# Patient Record
Sex: MALE | Race: WHITE | NOT HISPANIC OR LATINO | URBAN - METROPOLITAN AREA
[De-identification: names, ages, dates, MRNs, and addresses within clinical notes are randomized per-mention and may not be internally consistent; named-entity substitution may affect disease eponyms.]

---

## 2018-11-23 ENCOUNTER — EMERGENCY (EMERGENCY)
Facility: HOSPITAL | Age: 33
LOS: 1 days | Discharge: HOME | End: 2018-11-23
Attending: EMERGENCY MEDICINE | Admitting: EMERGENCY MEDICINE
Payer: MEDICAID

## 2018-11-23 VITALS
RESPIRATION RATE: 16 BRPM | HEART RATE: 60 BPM | TEMPERATURE: 98 F | DIASTOLIC BLOOD PRESSURE: 74 MMHG | SYSTOLIC BLOOD PRESSURE: 119 MMHG

## 2018-11-23 VITALS
RESPIRATION RATE: 18 BRPM | HEART RATE: 60 BPM | OXYGEN SATURATION: 100 % | TEMPERATURE: 98 F | SYSTOLIC BLOOD PRESSURE: 133 MMHG | DIASTOLIC BLOOD PRESSURE: 70 MMHG

## 2018-11-23 LAB
APPEARANCE UR: CLEAR — SIGNIFICANT CHANGE UP
BILIRUB UR-MCNC: NEGATIVE — SIGNIFICANT CHANGE UP
BLOOD UR QL VISUAL: NEGATIVE — SIGNIFICANT CHANGE UP
COLOR SPEC: SIGNIFICANT CHANGE UP
GLUCOSE UR-MCNC: NEGATIVE — SIGNIFICANT CHANGE UP
KETONES UR-MCNC: NEGATIVE — SIGNIFICANT CHANGE UP
LEUKOCYTE ESTERASE UR-ACNC: NEGATIVE — SIGNIFICANT CHANGE UP
NITRITE UR-MCNC: NEGATIVE — SIGNIFICANT CHANGE UP
PH UR: 7 — SIGNIFICANT CHANGE UP (ref 5–8)
PROT UR-MCNC: NEGATIVE — SIGNIFICANT CHANGE UP
SP GR SPEC: 1.03 — SIGNIFICANT CHANGE UP (ref 1–1.04)
UROBILINOGEN FLD QL: NORMAL — SIGNIFICANT CHANGE UP

## 2018-11-23 PROCEDURE — 76870 US EXAM SCROTUM: CPT | Mod: 26

## 2018-11-23 PROCEDURE — 93975 VASCULAR STUDY: CPT | Mod: 26

## 2018-11-23 PROCEDURE — 99284 EMERGENCY DEPT VISIT MOD MDM: CPT

## 2018-11-23 RX ORDER — IBUPROFEN 200 MG
600 TABLET ORAL ONCE
Qty: 0 | Refills: 0 | Status: COMPLETED | OUTPATIENT
Start: 2018-11-23 | End: 2018-11-23

## 2018-11-23 RX ADMIN — Medication 600 MILLIGRAM(S): at 15:10

## 2018-11-23 RX ADMIN — Medication 600 MILLIGRAM(S): at 14:33

## 2018-11-23 NOTE — ED ADULT TRIAGE NOTE - CHIEF COMPLAINT QUOTE
Pt from urgent care for r/o testicular torsion, c/o left testicular pain and LLQ pain since waking up this morning, denies urinary complaints, denies discharge.

## 2018-11-23 NOTE — ED PROVIDER NOTE - NSFOLLOWUPINSTRUCTIONS_ED_ALL_ED_FT
Take ibuprofen 400mg every 6 hours as needed for pain. See the drug information box on packaging for more information.     Follow up in the urology clinic as soon as possible.    Please return to the ER for new or worsening pain, fevers or chills, difficulty urinating, or scrotal skin changes.

## 2018-11-23 NOTE — ED PROVIDER NOTE - OBJECTIVE STATEMENT
32 y/o male with PMHx of Asthma presents to the ED for left testicular pain today. Pt states today around 6am he turned over and started having achy left testicular pain. Pt went to [facility] who sent pt in for evaluation. Pt denies any fever, chills, nausea, vomiting, SOB, chest pain, or dysuria. Denies any penile discharge.

## 2018-11-23 NOTE — ED PROVIDER NOTE - PROGRESS NOTE DETAILS
Patient reports pain is bareable, slightly improved since starting this AM. Jorje pgy1 Patient reports pain is bareable, slightly improved since starting this AM. Plan for discharge w urology follow up. No epididmytis on US and clean UA, so will hold on abx. Jorje pgy1

## 2018-11-23 NOTE — ED PROVIDER NOTE - ATTENDING CONTRIBUTION TO CARE
Pt was seen and evaluated by me. Pt is a 34 y/o male with PMHx of Asthma presents to the ED for left testicular pain today. Pt states today around 6am he turned over and started having achy left testicular pain. Pt went to [facility] who sent pt in for evaluation. Pt denies any fever, chills, nausea, vomiting, SOB, chest pain, or dysuria. Denies any penile discharge. Lungs CTA b/l. RRR. Abd soft, with minimal LLQ tenderness. No hernia appreciated. Left testicle tenderness.

## 2018-11-23 NOTE — ED PROVIDER NOTE - MEDICAL DECISION MAKING DETAILS
32 y/o male with PMHx of Asthma with left testicular pain since 6am. Concern for torsion. US, Analgesia.

## 2018-11-23 NOTE — ED ADULT NURSE NOTE - NSIMPLEMENTINTERV_GEN_ALL_ED
Implemented All Universal Safety Interventions:  [place] to call system. Call bell, personal items and telephone within reach. Instruct patient to call for assistance. Room bathroom lighting operational. Non-slip footwear when patient is off stretcher. Physically safe environment: no spills, clutter or unnecessary equipment. Stretcher in lowest position, wheels locked, appropriate side rails in place.

## 2018-11-23 NOTE — ED PROVIDER NOTE - NSFOLLOWUPCLINICS_GEN_ALL_ED_FT
[facility] - Urology  Urology  [address], [address] [place] [address]  Phone: [phone]  Fax:   Follow Up Time:

## 2018-11-23 NOTE — ED ADULT NURSE NOTE - OBJECTIVE STATEMENT
pt received to room #22 with c/o Left testicular pain starting at 6 am. states he also has LLQ pain. denies burning with urination and penile discharge. no testicular swelling noted. states he has one sexual partner, does not use condoms.  denies n/v/d, sob and CP. significant other at bedside. pt seen by MD.  pending u/s. will cont to monitor.

## 2018-11-24 LAB
C TRACH RRNA SPEC QL NAA+PROBE: SIGNIFICANT CHANGE UP
N GONORRHOEA RRNA SPEC QL NAA+PROBE: SIGNIFICANT CHANGE UP
SPECIMEN SOURCE: SIGNIFICANT CHANGE UP

## 2021-08-09 PROBLEM — Z00.00 ENCOUNTER FOR PREVENTIVE HEALTH EXAMINATION: Status: ACTIVE | Noted: 2021-08-09

## 2021-08-09 PROBLEM — J45.909 UNSPECIFIED ASTHMA, UNCOMPLICATED: Chronic | Status: ACTIVE | Noted: 2018-11-23

## 2021-08-31 ENCOUNTER — APPOINTMENT (OUTPATIENT)
Dept: ORTHOPEDIC SURGERY | Facility: CLINIC | Age: 36
End: 2021-08-31